# Patient Record
Sex: MALE | Race: WHITE | NOT HISPANIC OR LATINO | Employment: FULL TIME | URBAN - METROPOLITAN AREA
[De-identification: names, ages, dates, MRNs, and addresses within clinical notes are randomized per-mention and may not be internally consistent; named-entity substitution may affect disease eponyms.]

---

## 2022-10-13 ENCOUNTER — TELEPHONE (OUTPATIENT)
Dept: AUDIOLOGY | Facility: CLINIC | Age: 87
End: 2022-10-13
Payer: MEDICARE

## 2022-10-13 NOTE — TELEPHONE ENCOUNTER
Confirmed with wife patient is scheduled for VNG with Tiffany. We are awaiting a referral from Dr. López at [facility] and will reach out to his office. We will call her back with confirmation update /rtoma/   ----- Message from [address] sent at 10/13/2022  4:14 PM CDT -----  Pt wife called in re: Request for pt last visit  summary at Dr. Dorsey office to be sent over to [facility] before Mr. Sanderson appt.  Please fax request to [phone], pt wife is also requesting a call back when this has been done, of if you have any questions please call pt wife @ .[phone]      Thanks c

## 2022-10-19 DIAGNOSIS — H93.13 TINNITUS OF BOTH EARS: ICD-10-CM

## 2022-10-19 DIAGNOSIS — R42 LIGHTHEADEDNESS: ICD-10-CM

## 2022-10-19 DIAGNOSIS — R42 DISEQUILIBRIUM: ICD-10-CM

## 2022-10-19 DIAGNOSIS — R42 VERTIGO: Primary | ICD-10-CM

## 2022-10-21 ENCOUNTER — CLINICAL SUPPORT (OUTPATIENT)
Dept: AUDIOLOGY | Facility: CLINIC | Age: 87
End: 2022-10-21
Payer: MEDICARE

## 2022-10-21 DIAGNOSIS — H90.3 SENSORY HEARING LOSS, BILATERAL: ICD-10-CM

## 2022-10-21 DIAGNOSIS — H81.4 VERTIGO OF CENTRAL ORIGIN: Primary | ICD-10-CM

## 2022-10-21 DIAGNOSIS — H93.13 TINNITUS OF BOTH EARS: ICD-10-CM

## 2022-10-21 PROCEDURE — 92540 PR VESTIBULAR EVAL NYSTAG FOVL&PERPH STIM OSCIL TRACKING: ICD-10-PCS | Mod: 26,S$PBB,, | Performed by: AUDIOLOGIST-HEARING AID FITTER

## 2022-10-21 PROCEDURE — 92537 PR CALORIC VSTBLR TEST W/REC BITHERMAL: ICD-10-PCS | Mod: 26,S$PBB,, | Performed by: AUDIOLOGIST-HEARING AID FITTER

## 2022-10-21 PROCEDURE — 92537 CALORIC VSTBLR TEST W/REC: CPT | Mod: 26,S$PBB,, | Performed by: AUDIOLOGIST-HEARING AID FITTER

## 2022-10-21 PROCEDURE — 92537 CALORIC VSTBLR TEST W/REC: CPT | Mod: PBBFAC | Performed by: AUDIOLOGIST-HEARING AID FITTER

## 2022-10-21 PROCEDURE — 92540 BASIC VESTIBULAR EVALUATION: CPT | Mod: PBBFAC | Performed by: AUDIOLOGIST-HEARING AID FITTER

## 2022-10-21 PROCEDURE — 92540 BASIC VESTIBULAR EVALUATION: CPT | Mod: 26,S$PBB,, | Performed by: AUDIOLOGIST-HEARING AID FITTER

## 2022-10-21 NOTE — PROGRESS NOTES
"Referring provider: [facility]     Shamar Sanderson was seen 10/21/2022 for Videonystagmography (VNG) testing. 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 Patient indicates that he has followed up with his primary care physician, but has not seen neurologist. Patient indicates that he did not have any forms of therapy after/CVA. Since having CVA, patient has been having more notable decrease in hearing bilaterally-patient describes "hollow "sound in ears bilaterally. Patient also has been having more lightheadedness along with off-balance symptomatology/potential/near vertigo symptomatology since his CVA. No falls. No ear pain bilaterally. No ear pressure bilaterally. Patient does have history of having difficulty hearing on the phone and difficulty hearing conversations. No ear discharge bilaterally. Patient does have some intermittent tinnitus bilaterally.    AUDIOGRAM/TYMPANOGRAM: 9/22/2022. ([facility])    RIGHT EAR:  Pure-tone audiogram notes mild sensorineural hearing loss through 750 Hz with low normal hearing/borderline mild sensorineural hearing loss at 1000 Hz with mild sensorineural hearing loss at 1500 Hz. Hearing slopes to moderate sensorineural hearing loss at 2000 Hz with severe sensorineural hearing loss between 3000 Hz and 8000 Hz.  35 dBSRT.  48 %WDS.  Type As tympanogram.    LEFT EAR:  Pure-tone audiogram notes mild sensorineural hearing loss at 250 Hz with low normal hearing/borderline mild sensorineural hearing loss at 500 Hz with mild sensorineural hearing loss between 750 Hz and 1500 Hz sloping to moderate sensorineural hearing loss at 2000 Hz. There is moderate severe sensorineural hearing loss at 3000 Hz with moderate severe/severe sensorineural hearing loss at 4000 Hz with severe sensorineural hearing loss between 6000 Hz and 8000 Hz.  30 dBSRT.  72 %WDS.  Type A tympanogram.    Oculomotor function " tests:  Sinusoidal tracking and OPKs were normal and symmetric.  Saccades - abnormal indicating a possible central abnormality.   Spontaneous nystagmus was absent.  Gaze nystagmus was absent.  Head-shake test was absent for after head shake nystagmus.  [place]-Hallpike Left was negative for BPPV.  [place]-Hallpike Right was negative for BPPV.  Static Positional nystagmus was absent.  Bi-thermal caloric irrigations revealed a 14% caloric weakness in the right ear, which is within normal limits, and 6% directional preponderance to the left, which is within normal limits.  Fixation suppression following caloric irrigations were normal.  RC: 7    RW: 15      LW: 20    Impressions:   Saccades were abnormal indicating a possible central abnormality; however, all other central test results were within normal limits. No peripheral abnormalities noted.     Rec:  1. ENT review     Tracings are scanned into computer.